# Patient Record
(demographics unavailable — no encounter records)

---

## 2017-01-01 NOTE — REP
Clinical:  Laxity on prior examination.

 

Comparison:  2017 .

 

Technique:  Real time gray-scale ultrasound using linear high frequency

transducer.

 

Findings:

Visualized femoral heads and acetabula along with overlying soft tissue

structures appear relatively normal by ultrasound.  No fluid collection or

effusion identified.

 

Left hip demonstrates 60 degrees alpha angle and 46 % coverage and stable on

stressed imaging.

 

Right hip demonstrates 60 degrees alpha angle and 49 % coverage and stable on

stressed imaging.

 

Impression:

While acetabular coverage remains in the indeterminate range bilaterally,

examination is otherwise normal and stability is noted on stressed imaging

bilaterally.

 

 

Signed by

Juan Dobbins MD 2017 05:41 A

## 2017-01-01 NOTE — DSES
DATE OF ADMISSION:  2017

DATE OF DISCHARGE:

 

Infant was born to a 36-year-old,  6, now para 5, mother via repeat 
elective

 (C) section on 2017 at 8:22 a.m.  Artificial rupture of membrane

at delivery.  Amniotic fluid was clear.  Three vessel cord noted.  Age of

gestation is 39 weeks.  Apgar score was 9 and 9.  Infant received vitamin K and

erythromycin ointment.  Parents declined hepatitis B vaccine.  Mother's blood

type is O Rh positive.  Antibody screen negative.  Group B Strep positive.

Hepatitis B surface antigen negative.  RPR and VDRL nonreactive.  HIV negative.

No history of herpes infection.                                                
                                                                               
                                                                               
                                                                               
                                                                               
                                                                               
Infant is breastfeeding. He has voided and passed meconium.  Infant's blood    
                                                                               
                                                              type is O Rh 
positive.

 

Initial  exam was unremarkable except for laxity on the left hip.  No 
click

or clunk noted.  On 2017, the infant was breast feeding better, voided and

passed meconium.  Dr. Powers noted a left hip click during exam. An ultrasound of

bilateral hips was requested which showed mild laxity bilaterally, no sangeetha

subluxation and low alpha angles. Radiologist suggested followup study.  Mother 
was

notified of the results.  On 2017, infant is doing well, voided and

passed meconium.  Vital signs remained stable.  Congenital heart screening, 
pulse

oximeter right hand 99% and right foot 100%.  BiliChek 5.8 at 45 hours of age.

Today's weight was 7 pounds 6 ounces.  Passed hearing test on both ears.

 

Discharge Exam:  Infant was pink.  Good suck.  With vigorous cry.

Anterior fontanelle open and flat.  Bilateral red reflex noted.  No cleft lip or

palate noted.  Neck was supple.

Chest symmetrical, no retraction.

Lungs with bilateral breath sounds.  No rales.

Heart:  Regular rate.  Normal rhythm.  No murmur.

Abdomen:  Soft.  Nondistended.  Good bowel sounds.  No hepatosplenomegaly.

Extremities:  No gross deformities.  Mild laxity on the right hip, but no click

noted.

Skin:  No rash.  No jaundice.

 

Infant was discharged home with parents.

 

DISCHARGE DIAGNOSES:

1.  Term  female, appropriate gestational age via repeat elective 
 section.

2.  Left Hip click.

 

PLAN:  Discharge home with mother.  Breast feed as tolerated.  Monitor for

jaundice.  Continue to monitor urine and bowel movements.  Repeat ultrasound of

bilateral hip as an outpatient.  Followup with Dr. Butt on 2017 at 1:
00 p.m.  The

plan was discussed with both parents.

[id number]

## 2017-01-01 NOTE — REP
Infant hip sonography:

 

History:  Left hip click.

 

Findings:  Axial and coronal images demonstrate normal symmetric femoral heads.

Minimal laxity is seen on manipulation of the hips bilaterally.  Percent

acetabular coverage is 48% on the left on coronal images and 36% on the right.

These are in the indeterminate range.  Alpha angles are somewhat low bilaterally

measured at 51 degrees on the left and 50 degrees on the right.

 

Impression:

 

Mild laxity bilaterally.  No sangeetha subluxation.  Low alpha angles.  Suggest

follow-up study.

 

 

Signed by

Shawn Nance MD 2017 03:38 P